# Patient Record
Sex: MALE | HISPANIC OR LATINO | Employment: UNEMPLOYED | URBAN - METROPOLITAN AREA
[De-identification: names, ages, dates, MRNs, and addresses within clinical notes are randomized per-mention and may not be internally consistent; named-entity substitution may affect disease eponyms.]

---

## 2020-05-25 ENCOUNTER — HOSPITAL ENCOUNTER (EMERGENCY)
Facility: CLINIC | Age: 30
Discharge: HOME | End: 2020-05-25
Attending: EMERGENCY MEDICINE | Admitting: EMERGENCY MEDICINE
Payer: OTHER GOVERNMENT

## 2020-05-25 ENCOUNTER — APPOINTMENT (OUTPATIENT)
Dept: GENERAL RADIOLOGY | Facility: CLINIC | Age: 30
End: 2020-05-25
Attending: EMERGENCY MEDICINE

## 2020-05-25 VITALS
SYSTOLIC BLOOD PRESSURE: 120 MMHG | DIASTOLIC BLOOD PRESSURE: 83 MMHG | RESPIRATION RATE: 16 BRPM | OXYGEN SATURATION: 95 % | WEIGHT: 227.51 LBS | HEART RATE: 95 BPM | TEMPERATURE: 98.6 F

## 2020-05-25 DIAGNOSIS — R91.8 ABNORMALITY OF LUNG ON CXR: ICD-10-CM

## 2020-05-25 DIAGNOSIS — Z20.822 SUSPECTED COVID-19 VIRUS INFECTION: ICD-10-CM

## 2020-05-25 DIAGNOSIS — R05.9 COUGH: ICD-10-CM

## 2020-05-25 PROCEDURE — 99283 EMERGENCY DEPT VISIT LOW MDM: CPT

## 2020-05-25 PROCEDURE — 71045 X-RAY EXAM CHEST 1 VIEW: CPT

## 2020-05-25 RX ORDER — BENZONATATE 100 MG/1
100 CAPSULE ORAL 3 TIMES DAILY PRN
Qty: 29 CAPSULE | Refills: 0 | Status: SHIPPED | OUTPATIENT
Start: 2020-05-25

## 2020-05-25 RX ORDER — AZITHROMYCIN 250 MG/1
TABLET, FILM COATED ORAL
Qty: 6 TABLET | Refills: 0 | Status: SHIPPED | OUTPATIENT
Start: 2020-05-25 | End: 2020-05-30

## 2020-05-25 SDOH — HEALTH STABILITY: MENTAL HEALTH: HOW OFTEN DO YOU HAVE A DRINK CONTAINING ALCOHOL?: NEVER

## 2020-05-25 ASSESSMENT — ENCOUNTER SYMPTOMS
ABDOMINAL PAIN: 0
DIARRHEA: 0
COUGH: 1
NAUSEA: 0
HEADACHES: 1
SORE THROAT: 0
MYALGIAS: 0
SHORTNESS OF BREATH: 0
FEVER: 1
VOMITING: 0

## 2020-05-25 NOTE — DISCHARGE INSTRUCTIONS
Discharge Instructions  Bronchitis, Pneumonia, Bronchospasm    You were seen today for a chest infection or inflammation. If your provider decided this was due to a bacterial infection, you may need an antibiotic. Sometimes these are caused by a virus, and then an antibiotic will not help.     Generally, every Emergency Department visit should have a follow-up clinic visit with either a primary or a specialty clinic/provider. Please follow-up as instructed by your emergency provider today.    Return to the Emergency Department if:  Your breathing gets much worse.  You are very weak, or feel much more ill.  You develop new symptoms, such as chest pain.  You cough up blood.  You are vomiting (throwing up) enough that you cannot keep fluids or your medicine down.    What can I do to help myself?  Fill any prescriptions the provider gave you and take them right away--especially antibiotics. Be sure to finish the whole antibiotic prescription.  You may be given a prescription for an inhaler, which can help loosen tight air passages.  Use this as needed, but not more often than directed. Inhalers work much better when used with a spacer.   You may be given a prescription for a steroid to reduce inflammation. Used long-term, these can have side effects, but for short-term use they are safe. You may notice restlessness or increased appetite.      You may use non-prescription cough or cold medicines. Cough medicines may help, but don t make the cough go away completely.   Avoid smoke, because this can make your symptoms worse. If you smoke, this may be a good time to quit! Consider using nicotine lozenges, gum, or patches to reduce cravings.   If you have a fever, Tylenol  (acetaminophen), Motrin  (ibuprofen), or Advil  (ibuprofen) may help bring fever down and may help you feel more comfortable. Be sure to read and follow the package directions, and ask your provider if you have questions.  Be sure to get your flu shot each  year.  For certain ages, the pneumonia shot can help prevent pneumonia.  If you were given a prescription for medicine here today, be sure to read all of the information (including the package insert) that comes with your prescription.  This will include important information about the medicine, its side effects, and any warnings that you need to know about.  The pharmacist who fills the prescription can provide more information and answer questions you may have about the medicine.  If you have questions or concerns that the pharmacist cannot address, please call or return to the Emergency Department.     Remember that you can always come back to the Emergency Department if you are not able to see your regular provider in the amount of time listed above, if you get any new symptoms, or if there is anything that worries you.     Discharge Instructions  COVID-19    Your Provider has determined that you should practice self-isolation and self-monitoring in order to protect yourself and your community from COVID-19, which is the disease caused by a new coronavirus. The virus spreads from person to person primarily by droplets when an infected person coughs or sneezes and the droplet either lands on another person or that other person touches a surface with the droplet on it. Diagnosis of COVID-19 can be made with a test but many times the test is unavailable or not necessary. There is no specific treatment or medicine for the disease.    Symptoms of COVID-19  Many people have no symptoms or mild symptoms.  Symptoms may usually appear 4 to 5 days (up to 14 days) after contact with another ill person. Some people will get severe symptoms and pneumonia. Usual symptoms are:     Fever    Cough   Trouble breathing   Less common symptoms are: Headache, body aches, sore    throat, sneezing, diarrhea.    How to Care for Yourself    Stay home.  Most people will recover from illness with mild symptoms.  Isolation by staying home  is the best method to prevent the spread of the illness. Do not go to work or school. Have a friend or relative do your shopping. Do not use public transportation (bus, train) or ridesharing (Lyft, Uber).    How long should I stay home?  If you have symptoms of a respiratory disease (fever, cough), you should stay home for at least 7 days, and for 3 days with no fever and improvement of respiratory symptoms--whichever is longer. (Your fever should be gone for 3 days without using fever-reducing medicine.)    For example, if you have a fever and coughing for 4 days, you need to stay home 3 more days with no fever for a total of 7 days. Or, if you have a fever and coughing for 5 days, you need to stay home 3 more days with no fever for a total of 8 days.    Separate yourself from other people in your home.?As much as possible, you should stay in one room and away from other people in your home. Also, use a separate bathroom, if possible. Avoid handling pets or other animals while sick.     Wear a facemask if you need to be around other people and cover your mouth and nose with a tissue when you cough or sneeze.     Avoid sharing personal household items. You should not share dishes, drinking glasses, forks/knives/spoons, towels, or bedding with other people in your home. After using these items, they should be washed with soap and water. Clean parts of your home that are touched often (doorknobs, faucets, countertops, etc.) daily.     Wash your hands often with soap and water for at least 20 seconds or use an alcohol-based hand  containing at least 60% alcohol.     Avoid touching your face.    Treat your symptoms: Take Acetaminophen (Tylenol) to treat body aches and fever as needed for comfort. Ibuprofen (Advil or Motrin) can be used as well if you still have symptoms after taking Tylenol.  Drink fluids. Rest.    Watch for worsening symptoms, shortness of breath, or difficulty    Breathing.    Employers/workplaces are being asked by the Centers for Disease Control (CDC) to not request notes/documentation for you to return to work or prove that you were ill. You may choose to show your employer this paperwork.    Return to the Emergency Department if:    If you are developing worsening breathing, shortness of breath, or feel worse you should seek medical attention.  If you are uncertain, contact your health care provider/clinic. If you need emergency medical attention, call 911 and tell them you have been ill.     You must quarantine for 14 days due to concerns for possible COVID-19 infection.

## 2020-05-25 NOTE — ED PROVIDER NOTES
History     Chief Complaint:  Cough       HPI   Sajan Maddox is a 29 year old male who presents with started 2 days ago on Saturday.  He describes the cough as productive of whitish sputum.  He notes associated subjective fever.  He also notes mild headache typically with cough although denies current headache.  Denies sore throat, congestion, shortness of breath, chest pain, leg swelling or body aches.  He denies abdominal pain, nausea, vomiting, or diarrhea.  He denies rashes.  His urinary changes.  Denies any recent travel outside the country.  Denies any known exposures to people with COVID-19 or respiratory symptoms.  He denies a history of asthma or smoking.  He notes he is most concerned about the cough and that is what brought him here.    Allergies:  No Known Allergies     Medications:    Medications reviewed. No current medications.     Past Medical History:    History reviewed. No pertinent past medical history.    Past Surgical History:    History reviewed. No pertinent surgical history.     Family History:    History reviewed. No pertinent family history.     Social History:  Smoking Status: Never Smoker  Smokeless Tobacco: Never Used  Alcohol Use: not currently   Drug Use: No    Review of Systems   Constitutional: Positive for fever ( subjective).   HENT: Negative for congestion and sore throat.    Respiratory: Positive for cough. Negative for shortness of breath.    Cardiovascular: Negative for chest pain and leg swelling.   Gastrointestinal: Negative for abdominal pain, diarrhea, nausea and vomiting.   Musculoskeletal: Negative for myalgias.   Skin: Negative for rash.   Neurological: Positive for headaches.   All other systems reviewed and are negative.      Physical Exam     Patient Vitals for the past 24 hrs:   BP Temp Temp src Pulse Heart Rate Resp SpO2 Weight   05/25/20 0950 -- -- -- 95 -- -- -- --   05/25/20 0911 -- -- -- 100 -- -- -- --   05/25/20 0910 -- -- -- -- -- -- 95 % --    05/25/20 0858 -- -- -- -- -- -- 94 % --   05/25/20 0857 -- -- -- -- -- -- 96 % --   05/25/20 0855 120/83 98.6  F (37  C) Oral 104 -- -- -- --   05/25/20 0840 123/76 99.5  F (37.5  C) Oral -- 113 16 99 % 103.2 kg (227 lb 8.2 oz)        Physical Exam  General: Adult male sitting upright  Eyes: PERRL, Conjunctive within normal limits  ENT: Moist mucous membranes, oropharynx clear.   CV: Normal S1S2, no murmur, rub or gallop. RRR.  Resp: Scattered exp wheeze on the right, o/w clear. No rales. Normal respiratory effort.  GI: Abdomen is soft, nontender and nondistended.   MSK: No edema. Nontender. Normal active range of motion.  Skin: Warm and dry. No rashes or lesions or ecchymoses on visible skin.  Neuro: Alert and oriented. Responds appropriately to all questions and commands. No focal findings appreciated. Normal muscle tone.  Psych: Normal mood and affect.     Emergency Department Course     Imaging:  Radiology findings were communicated with the patient who voiced understanding of the findings.    XR Chest Port 1 View  Final Result  IMPRESSION: There is a small amount of hazy opacity in the left  lateral midlung field and there is a small amount of streaky opacity  in the right lung base. The findings suggest infiltrate or  atelectasis. Normal otherwise. The cardiomediastinal silhouette and  pulmonary vasculature appear normal and no effusions are evident.  VIVI OHARA MD    Emergency Department Course:  Past medical records, nursing notes, and vitals reviewed.    0846 I performed an exam of the patient as documented above.    The patient was sent for imaging while in the emergency department, results above.       0956 Patient rechecked and updated.       Findings and plan explained to the Patient. Patient discharged home with instructions regarding supportive care, medications, and reasons to return. The importance of close follow-up was reviewed. The patient was prescribed azithromycin and tessalon.        Impression & Plan   Covid-19  Sajan Maddox was evaluated during a global COVID-19 pandemic, which necessitated consideration that the patient might be at risk for infection with the SARS-CoV-2 virus that causes COVID-19.   Applicable protocols for evaluation were followed during the patient's care.   COVID-19 was considered as part of the patient's evaluation. The plan for testing is:  the patient was referred for outpatient testing.     Medical Decision Making:  Sajan Maddox is a 29 year old male who presents to the emergency department today with cough and intermittent headache.  He is afebrile.  Mild initial tachycardia which I suspect was secondary to anxiety about being here improved into normal range without intervention.  He had no chest pain or shortness of breath, I clinically do not suspect pneumothorax or pulmonary embolism..  X-ray showed possible infiltrate or atelectasis.  I favor atelectasis given the poor inspiration.  However in this clinical setting he will be treated with azithromycin and considered possibly positive for COVID-19, not fitting testing recommendations in the emergency department as of yet.  He is recommended quarantine for 14 days.  Recommended return if he should worsen.  He verbalized understanding with the use of a .  As needed follow-up with PCP.  All questions are answered prior to discharge.      Discharge Diagnosis:    ICD-10-CM    1. Cough  R05 COVID-19 [facility] Referral   2. Abnormality of lung on CXR  R91.8 COVID-19 [facility] Referral   3. Suspected Covid-19 Virus Infection  Z20.828 COVID-19 [facility] Referral       Disposition:  Discharged to home.    Discharge Medications:  Discharge Medication List as of 5/25/2020  9:59 AM      START taking these medications    Details   azithromycin (ZITHROMAX) 250 MG tablet Take 2 tablets (500 mg) by mouth daily for 1 day, THEN 1 tablet (250 mg) daily for 4 days., Disp-6  tablet,R-0, Local Print      benzonatate (TESSALON) 100 MG capsule Take 1 capsule (100 mg) by mouth 3 times daily as needed for cough, Disp-29 capsule,R-0, Local Print             Scribe Disclosure:  I, Gt Morris, am serving as a scribe at 8:46 AM on 5/25/2020 to document services personally performed by Tanisha Bansal MD based on my observations and the provider's statements to me.      5/25/2020   Tanisha Bansal MD Jonkman, Tracy Dianne, MD  05/25/20 9965

## 2020-05-25 NOTE — ED TRIAGE NOTES
Pt presents for evaluation of headache, tactile fever and cough that started on Saturday, getting worse.  Took OTC medication on Saturday with minimal relief in symptoms.       needed.

## 2024-04-03 ENCOUNTER — HOSPITAL ENCOUNTER (EMERGENCY)
Facility: CLINIC | Age: 34
Discharge: HOME | End: 2024-04-03
Attending: EMERGENCY MEDICINE | Admitting: EMERGENCY MEDICINE

## 2024-04-03 VITALS
DIASTOLIC BLOOD PRESSURE: 80 MMHG | HEART RATE: 74 BPM | BODY MASS INDEX: 32.93 KG/M2 | OXYGEN SATURATION: 94 % | RESPIRATION RATE: 18 BRPM | WEIGHT: 230 LBS | SYSTOLIC BLOOD PRESSURE: 122 MMHG | TEMPERATURE: 97.5 F | HEIGHT: 70 IN

## 2024-04-03 DIAGNOSIS — G51.0 LEFT-SIDED BELL'S PALSY: ICD-10-CM

## 2024-04-03 PROCEDURE — 99284 EMERGENCY DEPT VISIT MOD MDM: CPT

## 2024-04-03 RX ORDER — VALACYCLOVIR HYDROCHLORIDE 1 G/1
1000 TABLET, FILM COATED ORAL 3 TIMES DAILY
Qty: 21 TABLET | Refills: 0 | Status: SHIPPED | OUTPATIENT
Start: 2024-04-03 | End: 2024-04-10

## 2024-04-03 RX ORDER — PREDNISONE 20 MG/1
60 TABLET ORAL DAILY
Qty: 21 TABLET | Refills: 0 | Status: SHIPPED | OUTPATIENT
Start: 2024-04-03 | End: 2024-04-10

## 2024-04-03 ASSESSMENT — ACTIVITIES OF DAILY LIVING (ADL): ADLS_ACUITY_SCORE: 35

## 2024-04-03 ASSESSMENT — COLUMBIA-SUICIDE SEVERITY RATING SCALE - C-SSRS
2. HAVE YOU ACTUALLY HAD ANY THOUGHTS OF KILLING YOURSELF IN THE PAST MONTH?: NO
1. IN THE PAST MONTH, HAVE YOU WISHED YOU WERE DEAD OR WISHED YOU COULD GO TO SLEEP AND NOT WAKE UP?: NO
6. HAVE YOU EVER DONE ANYTHING, STARTED TO DO ANYTHING, OR PREPARED TO DO ANYTHING TO END YOUR LIFE?: NO

## 2024-04-03 NOTE — ED PROVIDER NOTES
"  History     Chief Complaint:  Facial Droop     The history is provided by the patient. A  was used.      Sajan Maddox is a 33 year old male who presents ambulatory for evaluation of facial droop. Sajan reports the left side of his mouth began to droop yesterday at 1200. He states his left eyelid feels heavy and he is not eating well since symptoms onset. He denies chest pain, shortness of breath, numbness, tingling, or weakness. Denies known personal or family past medical history. Denies drug use or smoking.     Independent Historian:   None - Patient Only      Medications:    The patient is not currently taking any prescribed medications.     Past Medical History:    History reviewed.  No pertinent past medical history.     Physical Exam   Patient Vitals for the past 24 hrs:   BP Temp Temp src Pulse Resp SpO2 Height Weight   04/03/24 1014 -- -- -- -- -- -- 1.778 m (5' 10\") 104.3 kg (230 lb)   04/03/24 1006 -- -- -- -- -- 94 % -- --   04/03/24 1005 -- -- -- -- -- 93 % -- --   04/03/24 1004 -- -- -- -- -- 93 % -- --   04/03/24 1001 -- -- -- -- -- 92 % -- --   04/03/24 1000 122/80 -- -- 74 -- 94 % -- --   04/03/24 0947 -- -- -- -- -- 93 % -- --   04/03/24 0945 128/75 -- -- 72 -- 90 % -- --   04/03/24 0936 (!) 143/99 97.5  F (36.4  C) Temporal 76 18 96 % -- --     Physical Exam  General: Patient is alert and cooperative.  HENT:  left sided facial weakness, including forehead.  Normal L ear.   Eyes: EOMI. Normal conjunctiva. No vision change.   Neck:  Normal range of motion and appearance.   Cardiovascular:  Normal rate.   Pulmonary/Chest:  Effort normal.   Musculoskeletal: Normal range of motion. No edema or tenderness.   Neurological: oriented, normal strength, sensation, and coordination; exception: L facial weakness.   Skin: Warm and dry. No rash.   Psychiatric: Normal mood and affect. Normal behavior and judgement.      Emergency Department Course     Procedures "   None    Emergency Department Course & Assessments:  Assessments/Consultations/Discussion of Management or Tests:  ED Course as of 04/03/24 1017   Wed Apr 03, 2024   0945 I obtained history and examined the patient as noted above.        Interventions:  Medications - No data to display     Independent Interpretation (X-rays, CTs, rhythm strip):  None    Social Determinants of Health affecting care:   None    Disposition:  The patient was discharged to home.     Impression & Plan    CMS Diagnoses: None    MIPS (If applicable):  N/A    Medical Decision Making:  Sajan Maddox is a 33 year old male who presents for evaluation of unilateral facial weakness. While the most likely etiology considered was Bell's Palsy, nonetheless a broad differential was considered including CVA (especially brainstem CVA), Lyme disease manifestation, neuropathy associated with HTN, HIV, DM, Arriaza-Hunt syndrome, lymphoma, sarcoidosis, brain tumor, etc.  Given the patient's exam including detailed neurologic exam, history and symptoms, age and risk factors, I believe this most likely represents [place] Palsy.  I will initiate steroid therapy.  There is no indication for antivirals in this case.  I discussed with the patient normal Bell's Palsy care including eye moisture, taping eye shut at night, etc. They will follow up with their doctor, earlier if eye pain develops.  Return here for progressive symptoms as this is unlikely but possible to represent early Guillain-Marine syndrome.  Detailed D/C instructions given.     Diagnosis:    ICD-10-CM    1. Left-sided Bell's palsy  G51.0           Discharge Medications:  New Prescriptions    PREDNISONE (DELTASONE) 20 MG TABLET    Take 3 tablets (60 mg) by mouth daily for 7 days    VALACYCLOVIR (VALTREX) 1000 MG TABLET    Take 1 tablet (1,000 mg) by mouth 3 times daily for 7 days        Scribe Disclosure:  Fely PAGE Hailie, am serving as a scribe at 9:42 AM on 4/3/2024 to document  services personally performed by Henrry Rollins MD based on my observations and the provider's statements to me.  4/3/2024   Henrry Rollins MD Isaacson, Brian A, MD  04/03/24 1018

## 2024-04-03 NOTE — ED TRIAGE NOTES
Pt arrives ambulatory by self for left sided facial droop that started around noon yesterday. Reports some difficulty chewing but able to swallow well and no numbness or tingling or weakness on extremities. No allergies or daily meds and no meds taken. Notable left facial droop when asked to smile in triage, no droop noted to left eye but reports it is somewhat difficult to open and feels heavy.